# Patient Record
Sex: FEMALE | Race: WHITE | Employment: FULL TIME | URBAN - METROPOLITAN AREA
[De-identification: names, ages, dates, MRNs, and addresses within clinical notes are randomized per-mention and may not be internally consistent; named-entity substitution may affect disease eponyms.]

---

## 2017-12-18 PROCEDURE — 86038 ANTINUCLEAR ANTIBODIES: CPT | Performed by: INTERNAL MEDICINE

## 2019-02-07 PROBLEM — I10 ESSENTIAL HYPERTENSION: Status: ACTIVE | Noted: 2019-02-07

## 2019-02-07 PROBLEM — K21.9 GASTROESOPHAGEAL REFLUX DISEASE, ESOPHAGITIS PRESENCE NOT SPECIFIED: Status: ACTIVE | Noted: 2019-02-07

## 2019-08-20 PROCEDURE — 80307 DRUG TEST PRSMV CHEM ANLYZR: CPT | Performed by: INTERNAL MEDICINE

## 2019-08-27 PROBLEM — R77.8 ELEVATED TOTAL PROTEIN: Status: ACTIVE | Noted: 2019-08-27

## 2019-08-27 PROBLEM — R73.9 HYPERGLYCEMIA: Status: ACTIVE | Noted: 2019-08-27

## 2020-08-10 PROBLEM — G62.9 NEUROPATHY: Status: ACTIVE | Noted: 2020-08-10

## 2020-08-10 PROBLEM — Z12.11 COLON CANCER SCREENING: Status: ACTIVE | Noted: 2020-08-10

## 2020-08-10 PROBLEM — Z01.419 WELL WOMAN EXAM WITH ROUTINE GYNECOLOGICAL EXAM: Status: ACTIVE | Noted: 2020-08-10

## 2020-08-10 PROBLEM — Z12.31 VISIT FOR SCREENING MAMMOGRAM: Status: ACTIVE | Noted: 2020-08-10

## 2020-08-10 PROBLEM — Z13.29 SCREENING FOR ENDOCRINE DISORDER: Status: ACTIVE | Noted: 2020-08-10

## 2020-08-10 PROBLEM — Z13.220 LIPID SCREENING: Status: ACTIVE | Noted: 2020-08-10

## 2021-12-07 PROBLEM — K21.9 GASTROESOPHAGEAL REFLUX DISEASE WITHOUT ESOPHAGITIS: Status: ACTIVE | Noted: 2019-02-07

## 2022-10-28 ENCOUNTER — APPOINTMENT (OUTPATIENT)
Dept: GENERAL RADIOLOGY | Facility: HOSPITAL | Age: 52
End: 2022-10-28
Attending: EMERGENCY MEDICINE
Payer: COMMERCIAL

## 2022-10-28 ENCOUNTER — HOSPITAL ENCOUNTER (INPATIENT)
Facility: HOSPITAL | Age: 52
LOS: 6 days | Discharge: HOME HEALTH | End: 2022-11-04
Attending: EMERGENCY MEDICINE | Admitting: INTERNAL MEDICINE
Payer: COMMERCIAL

## 2022-10-28 ENCOUNTER — APPOINTMENT (OUTPATIENT)
Dept: MRI IMAGING | Facility: HOSPITAL | Age: 52
End: 2022-10-28
Attending: EMERGENCY MEDICINE
Payer: COMMERCIAL

## 2022-10-28 DIAGNOSIS — L02.611 ABSCESS OF RIGHT FOOT: ICD-10-CM

## 2022-10-28 DIAGNOSIS — L03.115 CELLULITIS OF FOOT, RIGHT: ICD-10-CM

## 2022-10-28 DIAGNOSIS — M86.9 OSTEOMYELITIS (HCC): ICD-10-CM

## 2022-10-28 DIAGNOSIS — L97.508: Primary | ICD-10-CM

## 2022-10-28 LAB
ALBUMIN SERPL-MCNC: 2.5 G/DL (ref 3.4–5)
ALBUMIN SERPL-MCNC: 2.6 G/DL (ref 3.4–5)
ALBUMIN/GLOB SERPL: 0.4 {RATIO} (ref 1–2)
ALBUMIN/GLOB SERPL: 0.4 {RATIO} (ref 1–2)
ALP LIVER SERPL-CCNC: 122 U/L
ALP LIVER SERPL-CCNC: 126 U/L
ALT SERPL-CCNC: 56 U/L
ALT SERPL-CCNC: 60 U/L
ANION GAP SERPL CALC-SCNC: 6 MMOL/L (ref 0–18)
ANION GAP SERPL CALC-SCNC: 7 MMOL/L (ref 0–18)
AST SERPL-CCNC: 35 U/L (ref 15–37)
AST SERPL-CCNC: 37 U/L (ref 15–37)
BASOPHILS # BLD AUTO: 0.01 X10(3) UL (ref 0–0.2)
BASOPHILS # BLD AUTO: 0.02 X10(3) UL (ref 0–0.2)
BASOPHILS NFR BLD AUTO: 0.1 %
BASOPHILS NFR BLD AUTO: 0.2 %
BILIRUB SERPL-MCNC: 0.4 MG/DL (ref 0.1–2)
BILIRUB SERPL-MCNC: 0.4 MG/DL (ref 0.1–2)
BUN BLD-MCNC: 6 MG/DL (ref 7–18)
BUN BLD-MCNC: 7 MG/DL (ref 7–18)
CALCIUM BLD-MCNC: 10.1 MG/DL (ref 8.5–10.1)
CALCIUM BLD-MCNC: 10.1 MG/DL (ref 8.5–10.1)
CHLORIDE SERPL-SCNC: 98 MMOL/L (ref 98–112)
CHLORIDE SERPL-SCNC: 98 MMOL/L (ref 98–112)
CO2 SERPL-SCNC: 31 MMOL/L (ref 21–32)
CO2 SERPL-SCNC: 33 MMOL/L (ref 21–32)
CREAT BLD-MCNC: 0.7 MG/DL
CREAT BLD-MCNC: 0.72 MG/DL
EOSINOPHIL # BLD AUTO: 0.06 X10(3) UL (ref 0–0.7)
EOSINOPHIL # BLD AUTO: 0.08 X10(3) UL (ref 0–0.7)
EOSINOPHIL NFR BLD AUTO: 0.6 %
EOSINOPHIL NFR BLD AUTO: 0.8 %
ERYTHROCYTE [DISTWIDTH] IN BLOOD BY AUTOMATED COUNT: 15.9 %
ERYTHROCYTE [DISTWIDTH] IN BLOOD BY AUTOMATED COUNT: 15.9 %
GFR SERPLBLD BASED ON 1.73 SQ M-ARVRAT: 101 ML/MIN/1.73M2 (ref 60–?)
GFR SERPLBLD BASED ON 1.73 SQ M-ARVRAT: 104 ML/MIN/1.73M2 (ref 60–?)
GLOBULIN PLAS-MCNC: 6.3 G/DL (ref 2.8–4.4)
GLOBULIN PLAS-MCNC: 6.5 G/DL (ref 2.8–4.4)
GLUCOSE BLD-MCNC: 142 MG/DL (ref 70–99)
GLUCOSE BLD-MCNC: 223 MG/DL (ref 70–99)
HCT VFR BLD AUTO: 32.6 %
HCT VFR BLD AUTO: 33.5 %
HGB BLD-MCNC: 10.3 G/DL
HGB BLD-MCNC: 10.6 G/DL
IMM GRANULOCYTES # BLD AUTO: 0.04 X10(3) UL (ref 0–1)
IMM GRANULOCYTES # BLD AUTO: 0.04 X10(3) UL (ref 0–1)
IMM GRANULOCYTES NFR BLD: 0.4 %
IMM GRANULOCYTES NFR BLD: 0.4 %
LYMPHOCYTES # BLD AUTO: 1.7 X10(3) UL (ref 1–4)
LYMPHOCYTES # BLD AUTO: 2.27 X10(3) UL (ref 1–4)
LYMPHOCYTES NFR BLD AUTO: 17.5 %
LYMPHOCYTES NFR BLD AUTO: 22.9 %
MCH RBC QN AUTO: 25.8 PG (ref 26–34)
MCH RBC QN AUTO: 26 PG (ref 26–34)
MCHC RBC AUTO-ENTMCNC: 31.6 G/DL (ref 31–37)
MCHC RBC AUTO-ENTMCNC: 31.6 G/DL (ref 31–37)
MCV RBC AUTO: 81.5 FL
MCV RBC AUTO: 82.3 FL
MONOCYTES # BLD AUTO: 0.35 X10(3) UL (ref 0.1–1)
MONOCYTES # BLD AUTO: 0.51 X10(3) UL (ref 0.1–1)
MONOCYTES NFR BLD AUTO: 3.6 %
MONOCYTES NFR BLD AUTO: 5.2 %
NEUTROPHILS # BLD AUTO: 6.99 X10 (3) UL (ref 1.5–7.7)
NEUTROPHILS # BLD AUTO: 6.99 X10(3) UL (ref 1.5–7.7)
NEUTROPHILS # BLD AUTO: 7.57 X10 (3) UL (ref 1.5–7.7)
NEUTROPHILS # BLD AUTO: 7.57 X10(3) UL (ref 1.5–7.7)
NEUTROPHILS NFR BLD AUTO: 70.6 %
NEUTROPHILS NFR BLD AUTO: 77.7 %
OSMOLALITY SERPL CALC.SUM OF ELEC: 284 MOSM/KG (ref 275–295)
OSMOLALITY SERPL CALC.SUM OF ELEC: 287 MOSM/KG (ref 275–295)
PLATELET # BLD AUTO: 662 10(3)UL (ref 150–450)
PLATELET # BLD AUTO: 696 10(3)UL (ref 150–450)
POTASSIUM SERPL-SCNC: 3 MMOL/L (ref 3.5–5.1)
POTASSIUM SERPL-SCNC: 3.1 MMOL/L (ref 3.5–5.1)
PROCALCITONIN SERPL-MCNC: 0.06 NG/ML (ref ?–0.16)
PROT SERPL-MCNC: 8.8 G/DL (ref 6.4–8.2)
PROT SERPL-MCNC: 9.1 G/DL (ref 6.4–8.2)
RBC # BLD AUTO: 4 X10(6)UL
RBC # BLD AUTO: 4.07 X10(6)UL
SARS-COV-2 RNA RESP QL NAA+PROBE: NOT DETECTED
SODIUM SERPL-SCNC: 136 MMOL/L (ref 136–145)
SODIUM SERPL-SCNC: 137 MMOL/L (ref 136–145)
WBC # BLD AUTO: 9.7 X10(3) UL (ref 4–11)
WBC # BLD AUTO: 9.9 X10(3) UL (ref 4–11)

## 2022-10-28 PROCEDURE — 73720 MRI LWR EXTREMITY W/O&W/DYE: CPT | Performed by: EMERGENCY MEDICINE

## 2022-10-28 PROCEDURE — 73630 X-RAY EXAM OF FOOT: CPT | Performed by: EMERGENCY MEDICINE

## 2022-10-28 RX ORDER — GADOTERATE MEGLUMINE 376.9 MG/ML
20 INJECTION INTRAVENOUS
Status: COMPLETED | OUTPATIENT
Start: 2022-10-28 | End: 2022-10-28

## 2022-10-28 NOTE — ED INITIAL ASSESSMENT (HPI)
Pt arrives to ED with complaints of a right foot wound. Pt states \"I had a blister on my foot by the toes which I didn't feel because I have neuropathy and then it just got infected and now the doctors think it needs to be cleaned out and its painful and my foot is red and swollen. \".

## 2022-10-29 PROBLEM — L03.115 CELLULITIS OF FOOT, RIGHT: Status: ACTIVE | Noted: 2022-10-29

## 2022-10-29 LAB
ANION GAP SERPL CALC-SCNC: 5 MMOL/L (ref 0–18)
BASOPHILS # BLD AUTO: 0.02 X10(3) UL (ref 0–0.2)
BASOPHILS NFR BLD AUTO: 0.3 %
BUN BLD-MCNC: 7 MG/DL (ref 7–18)
CALCIUM BLD-MCNC: 9.2 MG/DL (ref 8.5–10.1)
CHLORIDE SERPL-SCNC: 102 MMOL/L (ref 98–112)
CO2 SERPL-SCNC: 33 MMOL/L (ref 21–32)
CREAT BLD-MCNC: 0.51 MG/DL
EOSINOPHIL # BLD AUTO: 0.08 X10(3) UL (ref 0–0.7)
EOSINOPHIL NFR BLD AUTO: 1 %
ERYTHROCYTE [DISTWIDTH] IN BLOOD BY AUTOMATED COUNT: 16.1 %
EST. AVERAGE GLUCOSE BLD GHB EST-MCNC: 177 MG/DL (ref 68–126)
GFR SERPLBLD BASED ON 1.73 SQ M-ARVRAT: 112 ML/MIN/1.73M2 (ref 60–?)
GLUCOSE BLD-MCNC: 138 MG/DL (ref 70–99)
GLUCOSE BLD-MCNC: 140 MG/DL (ref 70–99)
GLUCOSE BLD-MCNC: 140 MG/DL (ref 70–99)
GLUCOSE BLD-MCNC: 152 MG/DL (ref 70–99)
GLUCOSE BLD-MCNC: 193 MG/DL (ref 70–99)
HBA1C MFR BLD: 7.8 % (ref ?–5.7)
HCT VFR BLD AUTO: 27.3 %
HGB BLD-MCNC: 8.5 G/DL
IMM GRANULOCYTES # BLD AUTO: 0.03 X10(3) UL (ref 0–1)
IMM GRANULOCYTES NFR BLD: 0.4 %
LYMPHOCYTES # BLD AUTO: 2.37 X10(3) UL (ref 1–4)
LYMPHOCYTES NFR BLD AUTO: 30.9 %
MCH RBC QN AUTO: 25.9 PG (ref 26–34)
MCHC RBC AUTO-ENTMCNC: 31.1 G/DL (ref 31–37)
MCV RBC AUTO: 83.2 FL
MONOCYTES # BLD AUTO: 0.41 X10(3) UL (ref 0.1–1)
MONOCYTES NFR BLD AUTO: 5.4 %
NEUTROPHILS # BLD AUTO: 4.75 X10 (3) UL (ref 1.5–7.7)
NEUTROPHILS # BLD AUTO: 4.75 X10(3) UL (ref 1.5–7.7)
NEUTROPHILS NFR BLD AUTO: 62 %
OSMOLALITY SERPL CALC.SUM OF ELEC: 290 MOSM/KG (ref 275–295)
PLATELET # BLD AUTO: 618 10(3)UL (ref 150–450)
POTASSIUM SERPL-SCNC: 3.4 MMOL/L (ref 3.5–5.1)
RBC # BLD AUTO: 3.28 X10(6)UL
SODIUM SERPL-SCNC: 140 MMOL/L (ref 136–145)
VANCOMYCIN PEAK SERPL-MCNC: 21.5 UG/ML (ref 30–50)
WBC # BLD AUTO: 7.7 X10(3) UL (ref 4–11)

## 2022-10-29 RX ORDER — ACETAMINOPHEN 10 MG/ML
1000 INJECTION, SOLUTION INTRAVENOUS EVERY 6 HOURS PRN
Status: DISCONTINUED | OUTPATIENT
Start: 2022-10-29 | End: 2022-10-30

## 2022-10-29 RX ORDER — MORPHINE SULFATE 2 MG/ML
0.5 INJECTION, SOLUTION INTRAMUSCULAR; INTRAVENOUS EVERY 2 HOUR PRN
Status: DISCONTINUED | OUTPATIENT
Start: 2022-10-29 | End: 2022-11-04

## 2022-10-29 RX ORDER — ONDANSETRON 2 MG/ML
4 INJECTION INTRAMUSCULAR; INTRAVENOUS EVERY 6 HOURS PRN
Status: DISCONTINUED | OUTPATIENT
Start: 2022-10-29 | End: 2022-11-04

## 2022-10-29 RX ORDER — DEXTROSE MONOHYDRATE 25 G/50ML
50 INJECTION, SOLUTION INTRAVENOUS
Status: DISCONTINUED | OUTPATIENT
Start: 2022-10-29 | End: 2022-11-04

## 2022-10-29 RX ORDER — HYDROCODONE BITARTRATE AND ACETAMINOPHEN 10; 325 MG/1; MG/1
1 TABLET ORAL EVERY 6 HOURS PRN
Status: DISCONTINUED | OUTPATIENT
Start: 2022-10-29 | End: 2022-11-04

## 2022-10-29 RX ORDER — SODIUM CHLORIDE 9 MG/ML
INJECTION, SOLUTION INTRAVENOUS CONTINUOUS
Status: DISCONTINUED | OUTPATIENT
Start: 2022-10-29 | End: 2022-11-04

## 2022-10-29 RX ORDER — PREGABALIN 75 MG/1
75 CAPSULE ORAL 3 TIMES DAILY
Status: DISCONTINUED | OUTPATIENT
Start: 2022-10-29 | End: 2022-11-04

## 2022-10-29 RX ORDER — METOPROLOL SUCCINATE 100 MG/1
100 TABLET, EXTENDED RELEASE ORAL EVERY 12 HOURS
Status: DISCONTINUED | OUTPATIENT
Start: 2022-10-29 | End: 2022-11-04

## 2022-10-29 RX ORDER — HEPARIN SODIUM 5000 [USP'U]/ML
5000 INJECTION, SOLUTION INTRAVENOUS; SUBCUTANEOUS EVERY 8 HOURS SCHEDULED
Status: DISCONTINUED | OUTPATIENT
Start: 2022-10-29 | End: 2022-11-04

## 2022-10-29 RX ORDER — POTASSIUM CHLORIDE 20 MEQ/1
40 TABLET, EXTENDED RELEASE ORAL ONCE
Status: COMPLETED | OUTPATIENT
Start: 2022-10-29 | End: 2022-10-29

## 2022-10-29 RX ORDER — VANCOMYCIN 2 GRAM/500 ML IN 0.9 % SODIUM CHLORIDE INTRAVENOUS
25 ONCE
Status: COMPLETED | OUTPATIENT
Start: 2022-10-29 | End: 2022-10-29

## 2022-10-29 RX ORDER — LORAZEPAM 0.5 MG/1
0.5 TABLET ORAL EVERY 6 HOURS PRN
Status: DISCONTINUED | OUTPATIENT
Start: 2022-10-29 | End: 2022-11-04

## 2022-10-29 RX ORDER — NICOTINE POLACRILEX 4 MG
30 LOZENGE BUCCAL
Status: DISCONTINUED | OUTPATIENT
Start: 2022-10-29 | End: 2022-11-04

## 2022-10-29 RX ORDER — DEXTROSE AND SODIUM CHLORIDE 5; .45 G/100ML; G/100ML
INJECTION, SOLUTION INTRAVENOUS CONTINUOUS
Status: ACTIVE | OUTPATIENT
Start: 2022-10-29 | End: 2022-10-29

## 2022-10-29 RX ORDER — PREGABALIN 50 MG/1
50 CAPSULE ORAL DAILY
COMMUNITY

## 2022-10-29 RX ORDER — NICOTINE POLACRILEX 4 MG
15 LOZENGE BUCCAL
Status: DISCONTINUED | OUTPATIENT
Start: 2022-10-29 | End: 2022-11-04

## 2022-10-29 RX ORDER — MORPHINE SULFATE 2 MG/ML
1 INJECTION, SOLUTION INTRAMUSCULAR; INTRAVENOUS EVERY 2 HOUR PRN
Status: DISCONTINUED | OUTPATIENT
Start: 2022-10-29 | End: 2022-11-04

## 2022-10-29 RX ORDER — PANTOPRAZOLE SODIUM 20 MG/1
20 TABLET, DELAYED RELEASE ORAL
Status: DISCONTINUED | OUTPATIENT
Start: 2022-10-29 | End: 2022-11-04

## 2022-10-29 RX ORDER — HYDROCODONE BITARTRATE AND ACETAMINOPHEN 10; 325 MG/1; MG/1
1 TABLET ORAL ONCE
Status: COMPLETED | OUTPATIENT
Start: 2022-10-29 | End: 2022-10-29

## 2022-10-29 RX ORDER — MORPHINE SULFATE 2 MG/ML
2 INJECTION, SOLUTION INTRAMUSCULAR; INTRAVENOUS EVERY 2 HOUR PRN
Status: DISCONTINUED | OUTPATIENT
Start: 2022-10-29 | End: 2022-11-04

## 2022-10-29 RX ORDER — GARLIC EXTRACT 500 MG
1 CAPSULE ORAL 2 TIMES DAILY
Status: DISCONTINUED | OUTPATIENT
Start: 2022-10-29 | End: 2022-11-04

## 2022-10-29 NOTE — ED QUICK NOTES
Orders for admission, patient is aware of plan and ready to go upstairs. Any questions, please call ED RN Sari Hooks  at extension [phone]. Vaccinated?  Yes  Type of COVID test sent: Rapid PCR  COVID Suspicion level: Low      Titratable drug(s) infusing:  Rate:    LOC at time of transport: axox4    Other pertinent information:    CIWA score=  NIH score=

## 2022-10-29 NOTE — PLAN OF CARE
Pt AOx4. R.A. C/o mild pain to RLE, relieved by PO pain meds. Kerlix roll dressing right foot, CDI. VS remain stable. Voiding freely, BM PTA. Tolerating diet, denies n/v. Up ad [place]. Heparin subcutaneous. Ankle pumps encouraged. Plan TBD, US to BLE to be done. Pt updated on plan of care, all questions answered. Belongings within reach, instructed to call for assistance.

## 2022-10-29 NOTE — PROGRESS NOTES
NURSING ADMISSION NOTE      Patient admitted  Oriented to room. Safety precautions initiated. Bed in low position. Call light in reach.

## 2022-10-29 NOTE — PROGRESS NOTES
At this time I talked with nursing this patient was last seen by Dr. Harriet Bowie so I asked them to put her on consult for the patient and I will sign off for now

## 2022-10-30 ENCOUNTER — ANESTHESIA (OUTPATIENT)
Dept: SURGERY | Facility: HOSPITAL | Age: 52
End: 2022-10-30
Payer: COMMERCIAL

## 2022-10-30 ENCOUNTER — ANESTHESIA EVENT (OUTPATIENT)
Dept: SURGERY | Facility: HOSPITAL | Age: 52
End: 2022-10-30
Payer: COMMERCIAL

## 2022-10-30 LAB
ANION GAP SERPL CALC-SCNC: 6 MMOL/L (ref 0–18)
BASOPHILS # BLD AUTO: 0.02 X10(3) UL (ref 0–0.2)
BASOPHILS NFR BLD AUTO: 0.2 %
BUN BLD-MCNC: 8 MG/DL (ref 7–18)
CALCIUM BLD-MCNC: 8.5 MG/DL (ref 8.5–10.1)
CHLORIDE SERPL-SCNC: 107 MMOL/L (ref 98–112)
CO2 SERPL-SCNC: 29 MMOL/L (ref 21–32)
CREAT BLD-MCNC: 0.73 MG/DL
EOSINOPHIL # BLD AUTO: 0.12 X10(3) UL (ref 0–0.7)
EOSINOPHIL NFR BLD AUTO: 1.2 %
ERYTHROCYTE [DISTWIDTH] IN BLOOD BY AUTOMATED COUNT: 16 %
GFR SERPLBLD BASED ON 1.73 SQ M-ARVRAT: 99 ML/MIN/1.73M2 (ref 60–?)
GLUCOSE BLD-MCNC: 111 MG/DL (ref 70–99)
GLUCOSE BLD-MCNC: 113 MG/DL (ref 70–99)
GLUCOSE BLD-MCNC: 143 MG/DL (ref 70–99)
GLUCOSE BLD-MCNC: 167 MG/DL (ref 70–99)
GLUCOSE BLD-MCNC: 180 MG/DL (ref 70–99)
GLUCOSE BLD-MCNC: 180 MG/DL (ref 70–99)
HCT VFR BLD AUTO: 30.8 %
HGB BLD-MCNC: 9.2 G/DL
IMM GRANULOCYTES # BLD AUTO: 0.04 X10(3) UL (ref 0–1)
IMM GRANULOCYTES NFR BLD: 0.4 %
LYMPHOCYTES # BLD AUTO: 1.82 X10(3) UL (ref 1–4)
LYMPHOCYTES NFR BLD AUTO: 17.9 %
MCH RBC QN AUTO: 26.1 PG (ref 26–34)
MCHC RBC AUTO-ENTMCNC: 29.9 G/DL (ref 31–37)
MCV RBC AUTO: 87.3 FL
MONOCYTES # BLD AUTO: 0.53 X10(3) UL (ref 0.1–1)
MONOCYTES NFR BLD AUTO: 5.2 %
NEUTROPHILS # BLD AUTO: 7.61 X10 (3) UL (ref 1.5–7.7)
NEUTROPHILS # BLD AUTO: 7.61 X10(3) UL (ref 1.5–7.7)
NEUTROPHILS NFR BLD AUTO: 75.1 %
OSMOLALITY SERPL CALC.SUM OF ELEC: 296 MOSM/KG (ref 275–295)
PLATELET # BLD AUTO: 599 10(3)UL (ref 150–450)
POTASSIUM SERPL-SCNC: 4.1 MMOL/L (ref 3.5–5.1)
RBC # BLD AUTO: 3.53 X10(6)UL
SODIUM SERPL-SCNC: 142 MMOL/L (ref 136–145)
VANCOMYCIN PEAK SERPL-MCNC: 25.3 UG/ML (ref 30–50)
VANCOMYCIN TROUGH SERPL-MCNC: 10.1 UG/ML (ref 10–20)
WBC # BLD AUTO: 10.1 X10(3) UL (ref 4–11)

## 2022-10-30 PROCEDURE — 0QBN0ZZ EXCISION OF RIGHT METATARSAL, OPEN APPROACH: ICD-10-PCS | Performed by: PODIATRIST

## 2022-10-30 PROCEDURE — 0QBN0ZX EXCISION OF RIGHT METATARSAL, OPEN APPROACH, DIAGNOSTIC: ICD-10-PCS | Performed by: PODIATRIST

## 2022-10-30 PROCEDURE — 0Y6M0ZC DETACHMENT AT RIGHT FOOT, PARTIAL 3RD RAY, OPEN APPROACH: ICD-10-PCS | Performed by: PODIATRIST

## 2022-10-30 PROCEDURE — 0QBQ0ZZ EXCISION OF RIGHT TOE PHALANX, OPEN APPROACH: ICD-10-PCS | Performed by: PODIATRIST

## 2022-10-30 PROCEDURE — 0J9Q0ZZ DRAINAGE OF RIGHT FOOT SUBCUTANEOUS TISSUE AND FASCIA, OPEN APPROACH: ICD-10-PCS | Performed by: PODIATRIST

## 2022-10-30 PROCEDURE — 0Y6M0ZB DETACHMENT AT RIGHT FOOT, PARTIAL 2ND RAY, OPEN APPROACH: ICD-10-PCS | Performed by: PODIATRIST

## 2022-10-30 PROCEDURE — 0JBQ0ZZ EXCISION OF RIGHT FOOT SUBCUTANEOUS TISSUE AND FASCIA, OPEN APPROACH: ICD-10-PCS | Performed by: PODIATRIST

## 2022-10-30 RX ORDER — NALOXONE HYDROCHLORIDE 0.4 MG/ML
80 INJECTION, SOLUTION INTRAMUSCULAR; INTRAVENOUS; SUBCUTANEOUS AS NEEDED
Status: DISCONTINUED | OUTPATIENT
Start: 2022-10-30 | End: 2022-10-30 | Stop reason: HOSPADM

## 2022-10-30 RX ORDER — DIPHENHYDRAMINE HYDROCHLORIDE 50 MG/ML
12.5 INJECTION INTRAMUSCULAR; INTRAVENOUS AS NEEDED
Status: DISCONTINUED | OUTPATIENT
Start: 2022-10-30 | End: 2022-10-30 | Stop reason: HOSPADM

## 2022-10-30 RX ORDER — ONDANSETRON 2 MG/ML
4 INJECTION INTRAMUSCULAR; INTRAVENOUS EVERY 6 HOURS PRN
Status: DISCONTINUED | OUTPATIENT
Start: 2022-10-30 | End: 2022-10-30 | Stop reason: HOSPADM

## 2022-10-30 RX ORDER — INSULIN ASPART 100 [IU]/ML
INJECTION, SOLUTION INTRAVENOUS; SUBCUTANEOUS ONCE
Status: DISCONTINUED | OUTPATIENT
Start: 2022-10-30 | End: 2022-10-30 | Stop reason: HOSPADM

## 2022-10-30 RX ORDER — METOPROLOL TARTRATE 5 MG/5ML
2.5 INJECTION INTRAVENOUS ONCE
Status: DISCONTINUED | OUTPATIENT
Start: 2022-10-30 | End: 2022-10-30 | Stop reason: HOSPADM

## 2022-10-30 RX ORDER — HYDROMORPHONE HYDROCHLORIDE 1 MG/ML
0.6 INJECTION, SOLUTION INTRAMUSCULAR; INTRAVENOUS; SUBCUTANEOUS EVERY 5 MIN PRN
Status: DISCONTINUED | OUTPATIENT
Start: 2022-10-30 | End: 2022-10-30 | Stop reason: HOSPADM

## 2022-10-30 RX ORDER — MIDAZOLAM HYDROCHLORIDE 1 MG/ML
1 INJECTION INTRAMUSCULAR; INTRAVENOUS EVERY 5 MIN PRN
Status: DISCONTINUED | OUTPATIENT
Start: 2022-10-30 | End: 2022-10-30 | Stop reason: HOSPADM

## 2022-10-30 RX ORDER — VANCOMYCIN HYDROCHLORIDE
1500 EVERY 12 HOURS
Status: DISCONTINUED | OUTPATIENT
Start: 2022-10-30 | End: 2022-10-31

## 2022-10-30 RX ORDER — ONDANSETRON 2 MG/ML
INJECTION INTRAMUSCULAR; INTRAVENOUS AS NEEDED
Status: DISCONTINUED | OUTPATIENT
Start: 2022-10-30 | End: 2022-10-30 | Stop reason: SURG

## 2022-10-30 RX ORDER — HYDROCODONE BITARTRATE AND ACETAMINOPHEN 5; 325 MG/1; MG/1
2 TABLET ORAL ONCE AS NEEDED
Status: DISCONTINUED | OUTPATIENT
Start: 2022-10-30 | End: 2022-10-30 | Stop reason: HOSPADM

## 2022-10-30 RX ORDER — HYDROCODONE BITARTRATE AND ACETAMINOPHEN 5; 325 MG/1; MG/1
1 TABLET ORAL ONCE AS NEEDED
Status: DISCONTINUED | OUTPATIENT
Start: 2022-10-30 | End: 2022-10-30 | Stop reason: HOSPADM

## 2022-10-30 RX ORDER — SODIUM CHLORIDE, SODIUM LACTATE, POTASSIUM CHLORIDE, CALCIUM CHLORIDE 600; 310; 30; 20 MG/100ML; MG/100ML; MG/100ML; MG/100ML
INJECTION, SOLUTION INTRAVENOUS CONTINUOUS
Status: DISCONTINUED | OUTPATIENT
Start: 2022-10-30 | End: 2022-10-30 | Stop reason: HOSPADM

## 2022-10-30 RX ORDER — MEPERIDINE HYDROCHLORIDE 25 MG/ML
12.5 INJECTION INTRAMUSCULAR; INTRAVENOUS; SUBCUTANEOUS AS NEEDED
Status: DISCONTINUED | OUTPATIENT
Start: 2022-10-30 | End: 2022-10-30 | Stop reason: HOSPADM

## 2022-10-30 RX ORDER — ACETAMINOPHEN 500 MG
1000 TABLET ORAL ONCE AS NEEDED
Status: DISCONTINUED | OUTPATIENT
Start: 2022-10-30 | End: 2022-10-30 | Stop reason: HOSPADM

## 2022-10-30 RX ORDER — HYDROMORPHONE HYDROCHLORIDE 1 MG/ML
0.2 INJECTION, SOLUTION INTRAMUSCULAR; INTRAVENOUS; SUBCUTANEOUS EVERY 5 MIN PRN
Status: DISCONTINUED | OUTPATIENT
Start: 2022-10-30 | End: 2022-10-30 | Stop reason: HOSPADM

## 2022-10-30 RX ORDER — HYDROMORPHONE HYDROCHLORIDE 1 MG/ML
0.4 INJECTION, SOLUTION INTRAMUSCULAR; INTRAVENOUS; SUBCUTANEOUS EVERY 5 MIN PRN
Status: DISCONTINUED | OUTPATIENT
Start: 2022-10-30 | End: 2022-10-30 | Stop reason: HOSPADM

## 2022-10-30 RX ORDER — KETAMINE HYDROCHLORIDE 50 MG/ML
INJECTION, SOLUTION, CONCENTRATE INTRAMUSCULAR; INTRAVENOUS AS NEEDED
Status: DISCONTINUED | OUTPATIENT
Start: 2022-10-30 | End: 2022-10-30 | Stop reason: SURG

## 2022-10-30 RX ORDER — MIDAZOLAM HYDROCHLORIDE 1 MG/ML
INJECTION INTRAMUSCULAR; INTRAVENOUS AS NEEDED
Status: DISCONTINUED | OUTPATIENT
Start: 2022-10-30 | End: 2022-10-30 | Stop reason: SURG

## 2022-10-30 RX ADMIN — ONDANSETRON 4 MG: 2 INJECTION INTRAMUSCULAR; INTRAVENOUS at 16:15:00

## 2022-10-30 RX ADMIN — SODIUM CHLORIDE: 9 INJECTION, SOLUTION INTRAVENOUS at 16:48:00

## 2022-10-30 RX ADMIN — KETAMINE HYDROCHLORIDE 25 MG: 50 INJECTION, SOLUTION, CONCENTRATE INTRAMUSCULAR; INTRAVENOUS at 16:09:00

## 2022-10-30 RX ADMIN — MIDAZOLAM HYDROCHLORIDE 2 MG: 1 INJECTION INTRAMUSCULAR; INTRAVENOUS at 16:09:00

## 2022-10-30 NOTE — OPERATIVE REPORT
Date of surgery: 10/30/22     Preoperative Diagnosis:   1. Acute osteomyelitis, right foot  2. Complicated abscess and cellulitis, right foot  3. Chronic nonhealing diabetic ulcer, right foot  4. Pathological fractures of metatarsals, right foot  5. Diabetic neuropathy    Postoperative Diagnosis: Same     Procedure:   1. Partial second ray amputation, right foot  2. Partial third ray amputation, right foot  3. Partial resection of the fourth metatarsal, right foot  4. Partial resection of proximal phalanx of the fourth toe, right foot  5. Incision and drainage of the plantar foot below fascia, right foot  6. Bone biopsy, right foot    Surgeon: Aleksey George D.P.M. Anesthesia: MAC  EBL: 100 mL     Findings: Patient with significant ulcerations of the second and third toes with acute osteomyelitis involving second, third, fourth rays. Extensive plantar forefoot ulcer with complicated abscess and malodor     Specimens:  1. Second metatarsal bone for cultures  2. Amputated second and third rays for pathology    Complications: None     Technique:  Patient was brought into the operating room and placed on the operating table in a supine position. Patient was then placed under anesthesia. A tourniquet was not utilized. Local anesthetic block was not given since patient is insensate. Right lower extremity was then prepped and draped in the usual aseptic manner. Using a 10 blade a circumferential incision was made around the second and third toes an incision was deepened directly down to bone. Using blunt and sharp dissection the second and third toes were disarticulated and passed off to the back table. Incision was carried proximally along the dorsal forefoot. Using blunt and sharp dissection soft tissues were freed of the distal second and third metatarsals. Significant bone loss and pathological fractures were noted. Purulence was noted.   Next, using a sagittal saw the distal aspects of the second and third metatarsals were resected and passed off to the back table. This completed the partial second and third ray amputations. Blunt and sharp dissection was carried down to identify the fourth metatarsophalangeal joint and significant purulence was noted in this location. Incision was carried through the joint and the base of the proximal phalanx and the distal aspect of the fourth metatarsal was identified. Next, using a combination of sagittal saw and rongeur the distal aspect of the fourth metatarsal was resected and the base of the proximal phalanx was resected as well. It was noted that the bone was soft in nature. It was noted that the there was further purulence coming from the plantar deep spaces. A chronic nonhealing ulcer was also noted along the proximal forefoot. Hence, a separate identifiable incision was made along the plantar forefoot with a 10 blade and the existing ulcer was sharply excised out and incision was carried proximally through the deep fascia. All nonviable skin, soft tissue, muscle, tendon were sharply excised. All plantar deep spaces were explored and purulence was evacuated. Next, surgical sites were copiously irrigated with Irrisept solution. Bleeders were ligated as necessary. Next, using clean instruments dissection was carried to the second metatarsal and bone specimens were taken from the second metatarsal and sent for cultures. Surgical site was copiously irrigated again with Irrisept solution. Surgical site was then packed with wet-to-dry sterile dressings. Patient tolerated the procedure well and was transferred to PACU in stable condition. She will continue to receive IV antibiotics on the floor.

## 2022-10-30 NOTE — PLAN OF CARE
Pt admitted for wound on R foot that has not been healing. Plan is for surgery today (10/30). Pt alert and oriented x4, VSS. Pt updated and agreeable to POC.

## 2022-10-30 NOTE — PROGRESS NOTES
[facility] Dosing Service    Initial Pharmacokinetic Consult for Vancomycin AUC Dosing    Mic Ragland is a 46year old patient who is being treated for osteomyelitis. Pharmacy has been asked to dose vancomycin by Dr. Jamil Luna    Weights:  Ideal body weight: 57 kg (125 lb 10.6 oz)  Adjusted ideal body weight: 74.1 kg (163 lb 6.4 oz)  Actual weight:  99.8 kg (220 lb)    Labs:  Lab Results   Component Value Date    CREATSERUM 0.51 10/29/2022    CREATSERUM 0.72 10/28/2022      CrCl:  Estimated Creatinine Clearance: 116.1 mL/min (A) (based on SCr of 0.51 mg/dL (L)). Based on the above:    1. This patient will receive a loading dose of Vancomycin  2000 mg IVPB (25mg/kg, capped at 2000 mg) x 1 dose. 2. Vancomycin peak and trough will be obtained prior to the next dose, in order to calculate AUC24. Goal AUC24 is 400-600 mg-h/L.    3. Pharmacy will order SCr as clinically indicated while on vancomycin to assess renal function. 4. Pharmacy will follow and monitor renal function, toxicity and efficacy. We appreciate the opportunity to assist in the care of this patient.     Juan Eugene PharmD  10/29/2022  9:04 PM  [address] [place] [address] Extension: [phone]

## 2022-10-30 NOTE — PLAN OF CARE
Pt arrived back to unit from PACU. Pt AOx4. R.A. Denies pain at this time. Ace wrap dressing right foot, CDI. Tolerating IV abx, ID following. VS remain stable. Voiding without difficulty to bedside commode, last BM 10/29. Tolerating diet, denies n/v. Accucheck done. Up x1 assist gait belt and walker, stand-pivot to commode. Partial WB to right foot. Ankle pumps encouraged. Cultures pending, PICC line to be placed. Arterial US to BLE to be done. Pt and mother updated on plan of care, all questions answered. Belongings within reach, instructed to call for assistance.

## 2022-10-31 LAB
ANION GAP SERPL CALC-SCNC: 3 MMOL/L (ref 0–18)
BASOPHILS # BLD AUTO: 0.02 X10(3) UL (ref 0–0.2)
BASOPHILS NFR BLD AUTO: 0.3 %
BUN BLD-MCNC: 8 MG/DL (ref 7–18)
CALCIUM BLD-MCNC: 8.5 MG/DL (ref 8.5–10.1)
CHLORIDE SERPL-SCNC: 110 MMOL/L (ref 98–112)
CO2 SERPL-SCNC: 31 MMOL/L (ref 21–32)
CREAT BLD-MCNC: 0.86 MG/DL
EOSINOPHIL # BLD AUTO: 0.09 X10(3) UL (ref 0–0.7)
EOSINOPHIL NFR BLD AUTO: 1.3 %
ERYTHROCYTE [DISTWIDTH] IN BLOOD BY AUTOMATED COUNT: 16.6 %
GFR SERPLBLD BASED ON 1.73 SQ M-ARVRAT: 81 ML/MIN/1.73M2 (ref 60–?)
GLUCOSE BLD-MCNC: 133 MG/DL (ref 70–99)
GLUCOSE BLD-MCNC: 133 MG/DL (ref 70–99)
GLUCOSE BLD-MCNC: 136 MG/DL (ref 70–99)
GLUCOSE BLD-MCNC: 146 MG/DL (ref 70–99)
GLUCOSE BLD-MCNC: 188 MG/DL (ref 70–99)
HCT VFR BLD AUTO: 26.6 %
HGB BLD-MCNC: 8.1 G/DL
IMM GRANULOCYTES # BLD AUTO: 0.03 X10(3) UL (ref 0–1)
IMM GRANULOCYTES NFR BLD: 0.4 %
LYMPHOCYTES # BLD AUTO: 2.14 X10(3) UL (ref 1–4)
LYMPHOCYTES NFR BLD AUTO: 31.4 %
MCH RBC QN AUTO: 26 PG (ref 26–34)
MCHC RBC AUTO-ENTMCNC: 30.5 G/DL (ref 31–37)
MCV RBC AUTO: 85.3 FL
MONOCYTES # BLD AUTO: 0.38 X10(3) UL (ref 0.1–1)
MONOCYTES NFR BLD AUTO: 5.6 %
NEUTROPHILS # BLD AUTO: 4.15 X10 (3) UL (ref 1.5–7.7)
NEUTROPHILS # BLD AUTO: 4.15 X10(3) UL (ref 1.5–7.7)
NEUTROPHILS NFR BLD AUTO: 61 %
OSMOLALITY SERPL CALC.SUM OF ELEC: 298 MOSM/KG (ref 275–295)
PLATELET # BLD AUTO: 508 10(3)UL (ref 150–450)
POTASSIUM SERPL-SCNC: 3.8 MMOL/L (ref 3.5–5.1)
RBC # BLD AUTO: 3.12 X10(6)UL
SODIUM SERPL-SCNC: 144 MMOL/L (ref 136–145)
WBC # BLD AUTO: 6.8 X10(3) UL (ref 4–11)

## 2022-10-31 PROCEDURE — B548ZZA ULTRASONOGRAPHY OF SUPERIOR VENA CAVA, GUIDANCE: ICD-10-PCS | Performed by: INTERNAL MEDICINE

## 2022-10-31 PROCEDURE — 02HV33Z INSERTION OF INFUSION DEVICE INTO SUPERIOR VENA CAVA, PERCUTANEOUS APPROACH: ICD-10-PCS | Performed by: INTERNAL MEDICINE

## 2022-10-31 RX ORDER — LIDOCAINE HYDROCHLORIDE 10 MG/ML
5 INJECTION, SOLUTION EPIDURAL; INFILTRATION; INTRACAUDAL; PERINEURAL
Status: COMPLETED | OUTPATIENT
Start: 2022-10-31 | End: 2022-10-31

## 2022-10-31 RX ORDER — CETIRIZINE HYDROCHLORIDE 10 MG/1
10 TABLET ORAL DAILY
Status: DISCONTINUED | OUTPATIENT
Start: 2022-10-31 | End: 2022-11-04

## 2022-10-31 RX ORDER — METRONIDAZOLE 500 MG/1
500 TABLET ORAL EVERY 8 HOURS SCHEDULED
Status: DISCONTINUED | OUTPATIENT
Start: 2022-10-31 | End: 2022-11-04

## 2022-10-31 NOTE — CM/SW NOTE
Department  notified of request for [facility] and Infusion , aidin referrals started. Assigned CM/SW to follow up with pt/family on further discharge planning.        Macrina Gomez  [facility]

## 2022-10-31 NOTE — PROGRESS NOTES
Patient seen at bedside in [facility]. No acute events overnight. Physical exam:   General: NAD  HEENT: EOMI, PERRLA  Respiratory: No wheezing, no rhonchi  Neuro: No focal deficits  Psych: Mood and affect normal    Lower extremity exam:  DP/PT palpable bilaterally. CFT<3secs all digits. Sensation diminished. RLE exam: Surgical site with viable wound margins, granular base, exposed metatarsals, no purulence or malodor. Decreased erythema and edema. Lab Results   Component Value Date    WBC 6.8 10/31/2022    HGB 8.1 10/31/2022    HCT 26.6 10/31/2022    .0 10/31/2022    CREATSERUM 0.86 10/31/2022    BUN 8 10/31/2022     10/31/2022    K 3.8 10/31/2022     10/31/2022    CO2 31.0 10/31/2022     10/31/2022    CA 8.5 10/31/2022     Last A1c value was 7.8% done 10/28/2022. Hospital Encounter on 10/28/22   1. Tissue Aerobic Culture     Status: Abnormal (Preliminary result)    Collection Time: 10/30/22  4:18 PM    Specimen: Foot, right; Tissue   Result Value Ref Range    Tissue Culture Result Pending N/A    Tissue Smear 1+ WBCs seen (A) N/A    Tissue Smear 2+ Gram positive cocci in pairs (A) N/A    Tissue Smear 1+ Gram Positive Rods (A) N/A   2. Anaerobic Culture     Status: None (Preliminary result)    Collection Time: 10/30/22  4:18 PM    Specimen: Foot, right; Tissue   Result Value Ref Range    Anaerobic Culture Pending N/A   3. Aerobic Bacterial Culture     Status: Abnormal    Collection Time: 10/28/22  9:34 PM    Specimen: Foot, right; Other   Result Value Ref Range    Aerobic Culture Result  N/A     Mixture of organisms suggestive of normal skin yomi    Aerobic Culture Result 3+ growth Streptococcus anginosus/constellatus (A) N/A    Aerobic Smear 4+ Gram Positive Cocci N/A    Aerobic Smear 1+ WBCs seen N/A   4.  Blood Culture     Status: None (Preliminary result)    Collection Time: 10/28/22  9:28 PM    Specimen: Blood,peripheral   Result Value Ref Range    Blood Culture Result No Growth 2 Days N/A     XR FOOT, COMPLETE (MIN 3 VIEWS), RIGHT (CPT=73630)    Result Date: 10/28/2022  CONCLUSION:  Osseous destruction and pathologic fracture involving the 3rd metatarsal head/neck region is highly concerning for osteomyelitis. There is also demineralization of 3rd metatarsal head that is also concerning for osteomyelitis. There is either surgical absence or marked demineralization of the 3rd digit that is also concerning for osteomyelitis. There is subluxation in fracture deformity with demineralization involving the proximal aspect of the proximal phalanx of the right 2nd digit with subluxation of the 2nd MTP joint that is also concerning for osteomyelitis. There is posterior dislocation of the right 4th MTP joint. There is diffuse soft tissue swelling that could be due to cellulitis. Clinical correlation is recommended. Dedicated MRI of the right foot is suggested for further evaluation. Dictated by (CST): Huseyin Erickson MD on 10/28/2022 at 9:36 PM     Finalized by (CST): Huseyin Erickson MD on 10/28/2022 at 9:38 PM       MRI FOOT (W+WO), RIGHT (CPT=73720)    Result Date: 10/28/2022  CONCLUSION:  1. Extensive cellulitis of the right forefoot with evidence of a ulcerative wound along the plantar aspect of foot at the level of the 2nd MTP joint with soft tissue air extending into the 2nd MTP joint. 2. Extensive destructive osseous changes about the foot with evidence of osteomyelitis involving the proximal to distal phalanges diffusely of the 2nd and 3rd digits, along with osteomyelitis and pathologic fractures of the 2nd and 3rd metatarsal heads. Extensive reactive bone marrow edema within the 2nd and 3rd metatarsal shafts. 3. Osteomyelitis is also seen to a lesser degree of the 4th ray, with osteomyelitis involving the 4th proximal and middle phalanges as well as involving the 4th metatarsal head which reveals a pathologic fracture.   There is dorsal displacement of the 4th  proximal phalanx relative to the metatarsal head. 4. 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 Dictated by (CST): Petey Hodgson DO on 10/28/2022 at 11:12 PM     Finalized by (CST): Petey Hodgson DO on 10/28/2022 at 11:23 PM         Assessment & Plan: 46year old female with     1. Acute osteomyelitis, right foot  2. Abscess and cellulitis, right foot  3. Pathological fractures of metatarsals, right foot  4. Diabetic neuropathy    Patient is status post partial second and third ray amputations and I&D done on 10/30/2022    No further purulence was noted from the surgical site today   Wet-to-dry sterile dressings were applied  Continue with local wound care  Heel weightbearing in surgical shoe    Follow-up OR cultures  Status post PICC line  Continue IV antibiotics    The patient will require further debridement and application of wound VAC. She will need a wound VAC and home care services. I will plan for further surgical intervention in the next few days. D/w ID    Will follow    Matt Roque D.P.M.

## 2022-10-31 NOTE — CM/SW NOTE
10/31/22 1200   CM/SW Referral Data   Referral Source Social Work (self-referral)   Reason for Referral Discharge planning   Informant EMR;Clinical Staff Member;Patient;Daughter   Patient Info   Patient's Current Mental Status at Time of Assessment Alert;Oriented   Patient's Home Environment Condo/Apt no elevator   Patient lives with Alone   Patient Status Prior to Admission   Independent with ADLs and Mobility Yes   Discharge Needs   Anticipated D/C needs Infusion care;Home health care       Chart reviewed for discharge planning. Patient is a 45 y/o woman admitted with LE osteo now s/p I&D and toe amputations. PT recommending home at discharge. Per ID notes, pt will need 6 weeks of IV abx. PICC placed and final orders pending. Met with pt and pt's dtr [facility] ([phone]) to discuss DC planning. Reviewed options for IV abx and pt/dtr prefer DC with HHC and home infusion. Pt lives alone in a second floor apartment with no elevator. She is planning to stay with her daughter at [facility]: [address] [facility]. No other DC needs/concerns at this time. Referrals pending for [facility] services and to [address] [place] [address] for infusion. Await confirmation of accepting [facility] providers and details for insurance coverage for IV abx. / to remain available for support and/or discharge planning.      Jere Denis LCSW  Discharge Planner  [phone]

## 2022-10-31 NOTE — DISCHARGE INSTRUCTIONS
Sometimes managing your health at home requires assistance. The [facility] team has recognized your preference to use [facility]. They can be reached by phone at [phone]. The fax number for your reference is [phone]. A representative from the home health agency will contact you or your family to schedule your first visit. It was recommended that you have IV antibiotics at home to facilitate your recovery and compliment your treatment. The [facility] team has set up delivery with [facility]. Contact information:  [address] [place], [address], [address] Phone: [phone]. The estimated delivery is TBD. It was recommended that you use wound care supplies at home to facilitate your recovery and compliment your treatment. The [facility] team has set up delivery with [employer]. Contact information:  phone [phone]. The estimated delivery is TBD. If you experienced any difficulties with the delivery, please contact the [facility] Case Management department at [phone]. Please call to schedule an appointment with infectious disease in 1 to 2 weeks. [facility]. Fax: [phone]. Tel: [phone]    Test your blood glucose at least twice a day, first thing in the morning before breakfast, and 2 hours after a big meal. Bring your glucometer and log of your blood sugars to endocrinology appt with Dr. Srini Khan on December 6.

## 2022-10-31 NOTE — PLAN OF CARE
Patient A & O x4. VSS, on RA. C/o mild pain, norco given. Incision to foot wrapped with ace wrap is clean, dry and intact. Voiding freely. Partial WB to right foot with surgical shoe. Right foot elevated on pillows. Safety measures in place. Instructed to use call light.

## 2022-10-31 NOTE — CM/SW NOTE
10/31/22 1408   Choice of Post-Acute Provider   Informed patient of right to choose their preferred provider Yes   List of appropriate post-acute services provided to patient/family with quality data Yes   Patient/family choice [facility]   Information given to Patient       Met with pt to discuss DC planning for [facility] and home infusion for rocephin daily. Reviewed pt's insurance coverage as given by Kody Norton. Pt concerned about costs and asked about possible options for less expensive antibiotics. Updated MD who stated possible option for ancef Q8. Request sent to Kody Norton via [address] to determine pricing of this medication. / to remain available for support and/or discharge planning.      Radha Alvarez [facility]  Discharge Planner  [phone]

## 2022-10-31 NOTE — PLAN OF CARE
Patient is alert and oriented x4. Room air. SCD on the left lower extremity. Right leg/foot is elevated. Patient used the bedside commode with partial weight bearing to right heel with surgical shoe. PICC was placed in left arm. Call light is placed on patient bedside. Bed is in the lowest position.

## 2022-11-01 ENCOUNTER — APPOINTMENT (OUTPATIENT)
Dept: ULTRASOUND IMAGING | Facility: HOSPITAL | Age: 52
End: 2022-11-01
Attending: PODIATRIST
Payer: COMMERCIAL

## 2022-11-01 LAB
GLUCOSE BLD-MCNC: 113 MG/DL (ref 70–99)
GLUCOSE BLD-MCNC: 132 MG/DL (ref 70–99)
GLUCOSE BLD-MCNC: 160 MG/DL (ref 70–99)
GLUCOSE BLD-MCNC: 163 MG/DL (ref 70–99)

## 2022-11-01 PROCEDURE — 93922 UPR/L XTREMITY ART 2 LEVELS: CPT | Performed by: PODIATRIST

## 2022-11-01 NOTE — PLAN OF CARE
Pt A&Ox4, VSS on RA, , SCD's on LLE. Pt reporting mild pain to R foot post ambulation. PO pain medications given with adequate relief. Dressing to R foot C/D/I; BID dressing changes. Pt up with min assist and walker; WB to R heel with post op shoe. PICC line to LUE flushed and CHG bath given. Pt voiding; last BM 11/1. Plan for possible surgery during this hospital stay. Plan of care reviewed with patient. Patient verbalizes understanding.

## 2022-11-01 NOTE — PLAN OF CARE
A/O VSS on room air. Right foot dressing clean dry and intact. Right foot elevated. Scd to left lower extremity. Partial WB to right heel with surgical shoe on. PICC to left arm. Norco for pain to right foot. Plan of care reviewed. Bed alarm on.call light within reach.

## 2022-11-01 NOTE — CM/SW NOTE
Mace Crews from [facility] states Cefazolin is more expensive $481/week vs $402 week for Ceftriaxone. Pt has not met her yearly $2500 deductible which is why the cost is so high. Once deductible is met, cost will be $120.87 per week. Pt updated on above. Mace Crews from [facility] will work out interest free payment plan with her. Mace Crews will talk to pt directly. Per pt she  will need more surgery to her foot prior to dc with plans for wound vac placement- await orders for wound vac. Dr. Liane Garcia will put in final IV ab orders with labs and anticipated stop date after her next surgery.     Rashi Dywer LCSW  /Discharge Planner

## 2022-11-02 ENCOUNTER — ANESTHESIA EVENT (OUTPATIENT)
Dept: SURGERY | Facility: HOSPITAL | Age: 52
End: 2022-11-02
Payer: COMMERCIAL

## 2022-11-02 LAB
GLUCOSE BLD-MCNC: 114 MG/DL (ref 70–99)
GLUCOSE BLD-MCNC: 140 MG/DL (ref 70–99)
GLUCOSE BLD-MCNC: 167 MG/DL (ref 70–99)
GLUCOSE BLD-MCNC: 170 MG/DL (ref 70–99)

## 2022-11-02 RX ORDER — HYDROCHLOROTHIAZIDE 25 MG/1
50 TABLET ORAL DAILY
Status: DISCONTINUED | OUTPATIENT
Start: 2022-11-02 | End: 2022-11-04

## 2022-11-02 NOTE — PLAN OF CARE
Alert and oriented x4. VSS. On RA. . SCD on LLE. Tolerating diet. Pain controlled with PRN medication. Kerlix and ace wrap to right foot C/D/I. Up with min assist and walker/ WB to right heel with post op shoe. Voiding freely. Plan is possible surgery during hospital stay. Call light within reach.

## 2022-11-02 NOTE — PROGRESS NOTES
Patient seen at bedside in [facility]. No acute events overnight. Patient's daughter is present at bedside. Physical exam:   General: NAD  HEENT: EOMI, PERRLA  Respiratory: No wheezing, no rhonchi  Neuro: No focal deficits  Psych: Mood and affect normal    Lower extremity exam:  DP/PT palpable bilaterally. CFT<3secs all digits. Sensation diminished. RLE exam: Surgical site with viable wound margins, granular base, exposed metatarsals, no purulence or malodor. No erythema and edema. Recent Labs   Lab 10/29/22  0533 10/30/22  0540 10/31/22  0459   RBC 3.28* 3.53* 3.12*   HGB 8.5* 9.2* 8.1*   HCT 27.3* 30.8* 26.6*   MCV 83.2 87.3 85.3   MCH 25.9* 26.1 26.0   MCHC 31.1 29.9* 30.5*   RDW 16.1 16.0 16.6   NEPRELIM 4.75 7.61 4.15   WBC 7.7 10.1 6.8   .0* 599.0* 508.0*       Recent Labs   Lab 10/28/22  1842 10/28/22  2105 10/29/22  0533 10/30/22  0540 10/31/22  0458   * 142* 140* 167* 133*   BUN 7 6* 7 8 8   CREATSERUM 0.70 0.72 0.51* 0.73 0.86   EGFRCR 104 101 112 99 81   CA 10.1 10.1 9.2 8.5 8.5   ALB 2.5* 2.6*  --   --   --     137 140 142 144   K 3.0* 3.1* 3.4* 4.1 3.8   CL 98 98 102 107 110   CO2 31.0 33.0* 33.0* 29.0 31.0   ALKPHO 122* 126*  --   --   --    AST 35 37  --   --   --    ALT 56 60*  --   --   --    BILT 0.4 0.4  --   --   --    TP 8.8* 9.1*  --   --   --      Last A1c value was 7.8% done 10/28/2022. Hospital Encounter on 10/28/22   1. Tissue Aerobic Culture     Status: Abnormal    Collection Time: 10/30/22  4:18 PM    Specimen: Foot, right; Tissue   Result Value Ref Range    Tissue Culture Result  N/A     Mixture of organisms suggestive of normal skin yomi    Tissue Culture Result  N/A     No Staph aureus, Beta Strep or Pseudo aeruginosa isolated    Tissue Smear 1+ WBCs seen (A) N/A    Tissue Smear 2+ Gram positive cocci in pairs (A) N/A    Tissue Smear 1+ Gram Positive Rods (A) N/A   2.  Anaerobic Culture     Status: None (Preliminary result)    Collection Time: 10/30/22  4:18 PM    Specimen: Foot, right; Tissue   Result Value Ref Range    Anaerobic Culture No Anaerobes to date N/A   3. Aerobic Bacterial Culture     Status: Abnormal    Collection Time: 10/28/22  9:34 PM    Specimen: Foot, right; Other   Result Value Ref Range    Aerobic Culture Result  N/A     Mixture of organisms suggestive of normal skin yomi    Aerobic Culture Result 3+ growth Streptococcus anginosus/constellatus (A) N/A    Aerobic Smear 4+ Gram Positive Cocci N/A    Aerobic Smear 1+ WBCs seen N/A   4. Blood Culture     Status: None (Preliminary result)    Collection Time: 10/28/22  9:28 PM    Specimen: Blood,peripheral   Result Value Ref Range    Blood Culture Result No Growth 4 Days N/A     XR FOOT, COMPLETE (MIN 3 VIEWS), RIGHT (CPT=73630)    Result Date: 10/28/2022  CONCLUSION:  Osseous destruction and pathologic fracture involving the 3rd metatarsal head/neck region is highly concerning for osteomyelitis. There is also demineralization of 3rd metatarsal head that is also concerning for osteomyelitis. There is either surgical absence or marked demineralization of the 3rd digit that is also concerning for osteomyelitis. There is subluxation in fracture deformity with demineralization involving the proximal aspect of the proximal phalanx of the right 2nd digit with subluxation of the 2nd MTP joint that is also concerning for osteomyelitis. There is posterior dislocation of the right 4th MTP joint. There is diffuse soft tissue swelling that could be due to cellulitis. Clinical correlation is recommended. Dedicated MRI of the right foot is suggested for further evaluation. Dictated by (CST): Clau Copeland MD on 10/28/2022 at 9:36 PM     Finalized by (CST): Clau Copeland MD on 10/28/2022 at 9:38 PM       MRI FOOT (W+WO), RIGHT (CPT=73720)    Result Date: 10/28/2022  CONCLUSION:  1.  Extensive cellulitis of the right forefoot with evidence of a ulcerative wound along the plantar aspect of foot at the level of the 2nd MTP joint with soft tissue air extending into the 2nd MTP joint. 2. Extensive destructive osseous changes about the foot with evidence of osteomyelitis involving the proximal to distal phalanges diffusely of the 2nd and 3rd digits, along with osteomyelitis and pathologic fractures of the 2nd and 3rd metatarsal heads. Extensive reactive bone marrow edema within the 2nd and 3rd metatarsal shafts. 3. Osteomyelitis is also seen to a lesser degree of the 4th ray, with osteomyelitis involving the 4th proximal and middle phalanges as well as involving the 4th metatarsal head which reveals a pathologic fracture. There is dorsal displacement of the 4th  proximal phalanx relative to the metatarsal head. 4. 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 Dictated by (CST): Kari Rubin DO on 10/28/2022 at 11:12 PM     Finalized by (CST): Kari Rubin DO on 10/28/2022 at 11:23 PM       Cain 120 (YTW=[id number])    Result Date: 11/1/2022  CONCLUSION:  Decreased right toe pressures suggest moderate stenosis. Location:  Dictated by (CST): Jesse Gray MD on 11/01/2022 at 5:20 PM     Finalized by (CST): Jesse Gray MD on 11/01/2022 at 5:23 PM         Assessment & Plan: 46year old female with     1. Acute osteomyelitis, right foot  2. Abscess and cellulitis, right foot  3. Pathological fractures of metatarsals, right foot  4.   Diabetic neuropathy    Patient is status post partial second and third ray amputations and I&D done on 10/30/2022    No further purulence was noted from the surgical site today   Wet-to-dry sterile dressings were applied  Continue with local wound care  Heel weightbearing in surgical shoe    OR cultures noted  Status post PICC line  Continue IV antibiotics    Discussed treatment with the patient and daughter at length  Patient will require further surgical treatment with wound debridement, partial closure and wound VAC  Patient was amenable to surgical treatment. N.p.o. after midnight  OR tomorrow afternoon    D/w ID    Will follow    Delmas Gosselin, D.P.M.

## 2022-11-02 NOTE — PLAN OF CARE
A&O x 4. VSS. On RA. Norco PRN for incisional pain. N/T to bilateral feet per baseline. Dressing to right foot C/D/I. Partial WB to right foot. Post op shoe when OOB. Up min assist to bathroom, voiding without issue. Denies any continued loose stools. SCD to LLE/Heparin. Reviewed POC, pain management, and fall precautions with pt. Plan for vascular consult and possibly back to OR with Dr. Mirna Lin. Will continue to monitor.

## 2022-11-02 NOTE — PROGRESS NOTES
Attempted to see the patient at 5:50 this evening. Patient was in [address] [facility] [facility] at the time. Dr. Ekta Norwood to follow patient.

## 2022-11-03 ENCOUNTER — ANESTHESIA (OUTPATIENT)
Dept: SURGERY | Facility: HOSPITAL | Age: 52
End: 2022-11-03
Payer: COMMERCIAL

## 2022-11-03 LAB
GLUCOSE BLD-MCNC: 106 MG/DL (ref 70–99)
GLUCOSE BLD-MCNC: 128 MG/DL (ref 70–99)
GLUCOSE BLD-MCNC: 147 MG/DL (ref 70–99)
GLUCOSE BLD-MCNC: 152 MG/DL (ref 70–99)
SARS-COV-2 RNA RESP QL NAA+PROBE: NOT DETECTED

## 2022-11-03 PROCEDURE — 0LBV0ZZ EXCISION OF RIGHT FOOT TENDON, OPEN APPROACH: ICD-10-PCS | Performed by: PODIATRIST

## 2022-11-03 PROCEDURE — 99233 SBSQ HOSP IP/OBS HIGH 50: CPT | Performed by: CLINICAL NURSE SPECIALIST

## 2022-11-03 PROCEDURE — 0JBQ0ZZ EXCISION OF RIGHT FOOT SUBCUTANEOUS TISSUE AND FASCIA, OPEN APPROACH: ICD-10-PCS | Performed by: PODIATRIST

## 2022-11-03 RX ORDER — MIDAZOLAM HYDROCHLORIDE 1 MG/ML
INJECTION INTRAMUSCULAR; INTRAVENOUS AS NEEDED
Status: DISCONTINUED | OUTPATIENT
Start: 2022-11-03 | End: 2022-11-03 | Stop reason: SURG

## 2022-11-03 RX ORDER — BUPIVACAINE HYDROCHLORIDE 5 MG/ML
INJECTION, SOLUTION EPIDURAL; INTRACAUDAL AS NEEDED
Status: DISCONTINUED | OUTPATIENT
Start: 2022-11-03 | End: 2022-11-03 | Stop reason: HOSPADM

## 2022-11-03 RX ORDER — INSULIN ASPART 100 [IU]/ML
INJECTION, SOLUTION INTRAVENOUS; SUBCUTANEOUS ONCE
Status: DISCONTINUED | OUTPATIENT
Start: 2022-11-03 | End: 2022-11-03 | Stop reason: HOSPADM

## 2022-11-03 RX ORDER — HYDROMORPHONE HYDROCHLORIDE 1 MG/ML
0.6 INJECTION, SOLUTION INTRAMUSCULAR; INTRAVENOUS; SUBCUTANEOUS EVERY 5 MIN PRN
Status: DISCONTINUED | OUTPATIENT
Start: 2022-11-03 | End: 2022-11-03 | Stop reason: HOSPADM

## 2022-11-03 RX ORDER — METRONIDAZOLE 500 MG/1
500 TABLET ORAL 3 TIMES DAILY
Qty: 123 TABLET | Refills: 0 | Status: SHIPPED
Start: 2022-11-03 | End: 2022-12-14

## 2022-11-03 RX ORDER — ONDANSETRON 2 MG/ML
4 INJECTION INTRAMUSCULAR; INTRAVENOUS EVERY 6 HOURS PRN
Status: DISCONTINUED | OUTPATIENT
Start: 2022-11-03 | End: 2022-11-03 | Stop reason: HOSPADM

## 2022-11-03 RX ORDER — NALOXONE HYDROCHLORIDE 0.4 MG/ML
80 INJECTION, SOLUTION INTRAMUSCULAR; INTRAVENOUS; SUBCUTANEOUS AS NEEDED
Status: DISCONTINUED | OUTPATIENT
Start: 2022-11-03 | End: 2022-11-03 | Stop reason: HOSPADM

## 2022-11-03 RX ORDER — HYDROMORPHONE HYDROCHLORIDE 1 MG/ML
0.2 INJECTION, SOLUTION INTRAMUSCULAR; INTRAVENOUS; SUBCUTANEOUS EVERY 5 MIN PRN
Status: DISCONTINUED | OUTPATIENT
Start: 2022-11-03 | End: 2022-11-03 | Stop reason: HOSPADM

## 2022-11-03 RX ORDER — SODIUM CHLORIDE, SODIUM LACTATE, POTASSIUM CHLORIDE, CALCIUM CHLORIDE 600; 310; 30; 20 MG/100ML; MG/100ML; MG/100ML; MG/100ML
INJECTION, SOLUTION INTRAVENOUS CONTINUOUS
Status: DISCONTINUED | OUTPATIENT
Start: 2022-11-03 | End: 2022-11-03 | Stop reason: HOSPADM

## 2022-11-03 RX ORDER — PERPHENAZINE 16 MG/1
TABLET, FILM COATED ORAL
Qty: 50 STRIP | Refills: 6 | Status: SHIPPED | OUTPATIENT
Start: 2022-11-03

## 2022-11-03 RX ORDER — HYDROMORPHONE HYDROCHLORIDE 1 MG/ML
0.4 INJECTION, SOLUTION INTRAMUSCULAR; INTRAVENOUS; SUBCUTANEOUS EVERY 5 MIN PRN
Status: DISCONTINUED | OUTPATIENT
Start: 2022-11-03 | End: 2022-11-03 | Stop reason: HOSPADM

## 2022-11-03 RX ORDER — SODIUM CHLORIDE, SODIUM LACTATE, POTASSIUM CHLORIDE, CALCIUM CHLORIDE 600; 310; 30; 20 MG/100ML; MG/100ML; MG/100ML; MG/100ML
INJECTION, SOLUTION INTRAVENOUS CONTINUOUS PRN
Status: DISCONTINUED | OUTPATIENT
Start: 2022-11-03 | End: 2022-11-03 | Stop reason: SURG

## 2022-11-03 RX ORDER — CEFTRIAXONE SODIUM 2 G/50ML
2 INJECTION, SOLUTION INTRAVENOUS EVERY 24 HOURS
Qty: 2050 ML | Refills: 0 | Status: SHIPPED | OUTPATIENT
Start: 2022-11-03 | End: 2022-12-14

## 2022-11-03 RX ADMIN — SODIUM CHLORIDE, SODIUM LACTATE, POTASSIUM CHLORIDE, CALCIUM CHLORIDE: 600; 310; 30; 20 INJECTION, SOLUTION INTRAVENOUS at 16:41:00

## 2022-11-03 RX ADMIN — SODIUM CHLORIDE, SODIUM LACTATE, POTASSIUM CHLORIDE, CALCIUM CHLORIDE: 600; 310; 30; 20 INJECTION, SOLUTION INTRAVENOUS at 17:19:00

## 2022-11-03 RX ADMIN — MIDAZOLAM HYDROCHLORIDE 2 MG: 1 INJECTION INTRAMUSCULAR; INTRAVENOUS at 16:41:00

## 2022-11-03 NOTE — CM/SW NOTE
Final IV abx orders received from Dr Cherylene Mace. Pt to go to OR today for I&D and placement of wound vac. Anticipate possible DC tomorrow. Updates sent to [facility] via [address]. New referral to [facility] for wound vac. Will need wound measurements for approval of vac. / to remain available for support and/or discharge planning. Ronald Newman [facility]  Discharge Planner  [phone]    Addendum:  Met with pt and pt's dtr at bedside to review DC planning as above. Pt stated she is now planning to stay with her mother (Holly Wayne , [facility]) for 1 week and then return to her home in [place]. Discussed possible DC tomorrow pending wound vac approval and medical clearance for DC. Pt asking to speak with diabetic educator. She also requested resources for coping with new medical changes. Resources for anxiety/depression given as well as phone contact with [facility]. Updated RN regarding request for diabetic educator. Updated providers with pt's new address. Spoke with Alexis Jett at [place] who confirmed they can still see pt at [facility]. Discussed anticipated need for [facility] on Saturday AM for IV abx and wound vac placement. / to remain available for support and/or discharge planning. Addendum:  Notified by [facility] that vac is approved and will be delivered to the hospital today.

## 2022-11-03 NOTE — ANESTHESIA POSTPROCEDURE EVALUATION
[facility] Patient Status:  Inpatient   Age/Gender 46year old female MRN [id number]   [facility] SURGERY Attending Tiki Holliday MD   [facility] Day # 5 PCP Mala Hood MD       Anesthesia Post-op Note    RIGHT FOOT WOUND DEBRIDEMENT, COMPLEX WOUND CLOSURE, APPLICATION OF WOUND VAC    Procedure Summary     Date: 11/03/22 Room / Location: [facility] OR 15 / [address] [facility] OR    Anesthesia Start: 0366 Anesthesia Stop: 6878    Procedure: RIGHT FOOT WOUND DEBRIDEMENT, COMPLEX WOUND CLOSURE, APPLICATION OF WOUND VAC (Right: Foot) Diagnosis: (right foot osteomyelitis, right foot surgical wound)    Surgeons: Thomas Gonzales DPM Anesthesiologist: Mike Montoya MD    Anesthesia Type: MAC ASA Status: 3          Anesthesia Type: MAC    Vitals Value Taken Time   /42 11/03/22 1743   Temp 97.2 11/03/22 1743   Pulse 85 11/03/22 1743   Resp 19 11/03/22 1743   SpO2 99 11/03/22 1743       Patient Location: PACU    Anesthesia Type: MAC    Airway Patency: patent    Postop Pain Control: adequate    Mental Status: preanesthetic baseline    Nausea/Vomiting: none    Cardiopulmonary/Hydration status: stable euvolemic    Complications: no apparent anesthesia related complications    Postop vital signs: stable    Dental Exam: Unchanged from Preop    Patient to be discharged from PACU when criteria met.

## 2022-11-03 NOTE — PLAN OF CARE
A&O x 4. VSS. On RA. No c/o pain this am. N/T to bilateral feet per baseline. Dressing to right foot C/D/I. Partial WB to right foot. Post op shoe when OOB. Up min assist to bathroom, voiding without issue. SCD to LLE/Heparin on hold for surgery. Reviewed POC, pain management, and fall precautions with pt. NPO for OR this afternoon. Will continue to monitor. bennym re 's recommendation re her calling Dr. Higgins office. If any questions please contact the office

## 2022-11-03 NOTE — PLAN OF CARE
Aox4, chronic neuropathy to BLE, dressing to right foot c/d/I, informed consent signed and on chart, patient anxious for OR today, PRN Ativan given with relief, on room air , scds, on Rocephin + Flagyl PO, up heel weight bearing with post-op shoe, PT/OT, no further needs at this time, will continue to monitor.

## 2022-11-04 VITALS
RESPIRATION RATE: 20 BRPM | OXYGEN SATURATION: 92 % | WEIGHT: 220 LBS | BODY MASS INDEX: 36.65 KG/M2 | HEART RATE: 88 BPM | SYSTOLIC BLOOD PRESSURE: 126 MMHG | HEIGHT: 65 IN | TEMPERATURE: 99 F | DIASTOLIC BLOOD PRESSURE: 83 MMHG

## 2022-11-04 LAB
ANION GAP SERPL CALC-SCNC: 5 MMOL/L (ref 0–18)
BASOPHILS # BLD AUTO: 0.02 X10(3) UL (ref 0–0.2)
BASOPHILS NFR BLD AUTO: 0.4 %
BUN BLD-MCNC: 10 MG/DL (ref 7–18)
CALCIUM BLD-MCNC: 9 MG/DL (ref 8.5–10.1)
CHLORIDE SERPL-SCNC: 108 MMOL/L (ref 98–112)
CO2 SERPL-SCNC: 30 MMOL/L (ref 21–32)
CREAT BLD-MCNC: 0.76 MG/DL
EOSINOPHIL # BLD AUTO: 0.13 X10(3) UL (ref 0–0.7)
EOSINOPHIL NFR BLD AUTO: 2.7 %
ERYTHROCYTE [DISTWIDTH] IN BLOOD BY AUTOMATED COUNT: 17.2 %
GFR SERPLBLD BASED ON 1.73 SQ M-ARVRAT: 94 ML/MIN/1.73M2 (ref 60–?)
GLUCOSE BLD-MCNC: 111 MG/DL (ref 70–99)
GLUCOSE BLD-MCNC: 113 MG/DL (ref 70–99)
GLUCOSE BLD-MCNC: 124 MG/DL (ref 70–99)
HCT VFR BLD AUTO: 28.3 %
HGB BLD-MCNC: 8.8 G/DL
IMM GRANULOCYTES # BLD AUTO: 0.01 X10(3) UL (ref 0–1)
IMM GRANULOCYTES NFR BLD: 0.2 %
LYMPHOCYTES # BLD AUTO: 1.92 X10(3) UL (ref 1–4)
LYMPHOCYTES NFR BLD AUTO: 39.7 %
MCH RBC QN AUTO: 26.4 PG (ref 26–34)
MCHC RBC AUTO-ENTMCNC: 31.1 G/DL (ref 31–37)
MCV RBC AUTO: 85 FL
MONOCYTES # BLD AUTO: 0.29 X10(3) UL (ref 0.1–1)
MONOCYTES NFR BLD AUTO: 6 %
NEUTROPHILS # BLD AUTO: 2.47 X10 (3) UL (ref 1.5–7.7)
NEUTROPHILS # BLD AUTO: 2.47 X10(3) UL (ref 1.5–7.7)
NEUTROPHILS NFR BLD AUTO: 51 %
OSMOLALITY SERPL CALC.SUM OF ELEC: 296 MOSM/KG (ref 275–295)
PLATELET # BLD AUTO: 484 10(3)UL (ref 150–450)
POTASSIUM SERPL-SCNC: 3.2 MMOL/L (ref 3.5–5.1)
RBC # BLD AUTO: 3.33 X10(6)UL
SODIUM SERPL-SCNC: 143 MMOL/L (ref 136–145)
WBC # BLD AUTO: 4.8 X10(3) UL (ref 4–11)

## 2022-11-04 RX ORDER — HYDROCHLOROTHIAZIDE 50 MG/1
50 TABLET ORAL DAILY
COMMUNITY

## 2022-11-04 RX ORDER — POTASSIUM CHLORIDE 20 MEQ/1
40 TABLET, EXTENDED RELEASE ORAL ONCE
Status: COMPLETED | OUTPATIENT
Start: 2022-11-04 | End: 2022-11-04

## 2022-11-04 NOTE — PLAN OF CARE
Alert and oriented x4. VSS. On 2L NC. . IS encouraged. Tolerating diet. Voiding freely. Pain controlled with PRN medication. Ace wrap dressing to RLE C/D/I. Wound vac on, no drainage. Up with 1 assist and walker. Partial WB to right foot. Post op shoe when OOB. Call light within reach.

## 2022-11-04 NOTE — PLAN OF CARE
DC instructions reviewed with pt and all questions answered. PICC line flushed and capped, tube gauze placed. All belongings sent with pt.

## 2022-11-04 NOTE — WOUND PROGRESS NOTE
[facility]  Inpatient Wound Care Contact Note    Rosina Jose Patient Status:  Inpatient    7/10/1970 MRN [id number]   [facility] 3SW-A Attending Bin Dumont MD   Hosp Day # 6 PCP Freda Tompkins MD      Spoke with the nurse regarding the consult. NPWT was applied yesterday in the OR. Wound care will follow up  and do the consult and wound vac change on  if patient still here. If patient is discharge prior to  please remove the wound vac and apply wet to dry dressing in the wound. We will continue to follow this patient while in-house and assist with wound care discharge planning. [place] [phone] or page me at [phone] if you have any questions about this consultation and plan of care. If unable to reach me at these, please call the Inpatient Wound Care pager at #[phone].       Thank you,    Raymond George RN BSN [facility] [address] [facility]  [place]  [phone]  Spectralink: [phone]  Pager: [phone]

## 2022-11-04 NOTE — CM/SW NOTE
Anticipate DC today. Spoke with Kristi Martin from [facility] who stated wound vac to be delivered to pt's room today by noon. [facility] aware of DC plan for today. / to remain available for support and/or discharge planning.      Chacha Sinclair, [facility]  Discharge Planner  [phone]

## 2022-11-04 NOTE — PROGRESS NOTES
Diabetic teaching done per diabetic educator, reviewed w/ Tristen Carias RN, plan to dc w/ Fabrizio Mai , awaiting daughter to arrive for , AVS ready for printout.

## (undated) DEVICE — SUT VICRYL 3-0 CT-2 J232H

## (undated) DEVICE — SUT PDS II 4-0 PS-2 Z496G

## (undated) DEVICE — PREMIUM WET SKIN PREP TRAY: Brand: MEDLINE INDUSTRIES, INC.

## (undated) DEVICE — STERILE SYNTHETIC POLYISOPRENE POWDER-FREE SURGICAL GLOVES WITH HYDROGEL COATING: Brand: PROTEXIS

## (undated) DEVICE — 450 ML BOTTLE OF 0.05% CHLORHEXIDINE GLUCONATE IN 99.95% STERILE WATER FOR IRRIGATION, USP AND APPLICATOR.: Brand: IRRISEPT ANTIMICROBIAL WOUND LAVAGE

## (undated) DEVICE — BLADE SAW KM33-11

## (undated) DEVICE — UNDYED BRAIDED (POLYGLACTIN 910), SYNTHETIC ABSORBABLE SUTURE: Brand: COATED VICRYL

## (undated) DEVICE — SUT ETHILON 2-0 FSLX 1674H

## (undated) DEVICE — SUT PROLENE 2-0 CT-2 8411H

## (undated) DEVICE — SLEEVE KENDALL SCD EXPRESS MED

## (undated) DEVICE — SUT VICRYL 2-0 CP-1 J266H

## (undated) DEVICE — SOLUTION  .9 1000ML BTL

## (undated) DEVICE — BANDAGE,GAUZE,CONFORMING,4"X75",STRL,LF: Brand: MEDLINE

## (undated) DEVICE — BNDG COMPR W6INXL3YD DISP

## (undated) DEVICE — SUT MONOCRYL 4-0 PS-2 Y496G

## (undated) DEVICE — LOWER EXTREMITY CDS-LF: Brand: MEDLINE INDUSTRIES, INC.

## (undated) DEVICE — PAD,ABDOMINAL,8"X7.5",ST,LF,20/BX: Brand: MEDLINE INDUSTRIES, INC.

## (undated) DEVICE — SOL  .9 1000ML BAG